# Patient Record
Sex: MALE | Race: BLACK OR AFRICAN AMERICAN | NOT HISPANIC OR LATINO | URBAN - METROPOLITAN AREA
[De-identification: names, ages, dates, MRNs, and addresses within clinical notes are randomized per-mention and may not be internally consistent; named-entity substitution may affect disease eponyms.]

---

## 2021-02-19 ENCOUNTER — EMERGENCY (EMERGENCY)
Facility: HOSPITAL | Age: 31
LOS: 1 days | Discharge: HOME | End: 2021-02-19
Attending: EMERGENCY MEDICINE | Admitting: EMERGENCY MEDICINE
Payer: MEDICAID

## 2021-02-19 VITALS
RESPIRATION RATE: 18 BRPM | OXYGEN SATURATION: 100 % | TEMPERATURE: 98 F | HEART RATE: 92 BPM | SYSTOLIC BLOOD PRESSURE: 161 MMHG | DIASTOLIC BLOOD PRESSURE: 76 MMHG

## 2021-02-19 VITALS
RESPIRATION RATE: 16 BRPM | SYSTOLIC BLOOD PRESSURE: 130 MMHG | DIASTOLIC BLOOD PRESSURE: 74 MMHG | HEART RATE: 70 BPM | TEMPERATURE: 98 F | OXYGEN SATURATION: 100 %

## 2021-02-19 LAB
ALBUMIN SERPL ELPH-MCNC: 4.5 G/DL — SIGNIFICANT CHANGE UP (ref 3.3–5)
ALP SERPL-CCNC: 55 U/L — SIGNIFICANT CHANGE UP (ref 40–120)
ALT FLD-CCNC: 13 U/L — SIGNIFICANT CHANGE UP (ref 4–41)
ANION GAP SERPL CALC-SCNC: 13 MMOL/L — SIGNIFICANT CHANGE UP (ref 7–14)
AST SERPL-CCNC: 15 U/L — SIGNIFICANT CHANGE UP (ref 4–40)
BASOPHILS # BLD AUTO: 0.01 K/UL — SIGNIFICANT CHANGE UP (ref 0–0.2)
BASOPHILS NFR BLD AUTO: 0.2 % — SIGNIFICANT CHANGE UP (ref 0–2)
BILIRUB SERPL-MCNC: 0.3 MG/DL — SIGNIFICANT CHANGE UP (ref 0.2–1.2)
BUN SERPL-MCNC: 19 MG/DL — SIGNIFICANT CHANGE UP (ref 7–23)
CALCIUM SERPL-MCNC: 9 MG/DL — SIGNIFICANT CHANGE UP (ref 8.4–10.5)
CHLORIDE SERPL-SCNC: 101 MMOL/L — SIGNIFICANT CHANGE UP (ref 98–107)
CO2 SERPL-SCNC: 23 MMOL/L — SIGNIFICANT CHANGE UP (ref 22–31)
CREAT SERPL-MCNC: 1.28 MG/DL — SIGNIFICANT CHANGE UP (ref 0.5–1.3)
EOSINOPHIL # BLD AUTO: 0.05 K/UL — SIGNIFICANT CHANGE UP (ref 0–0.5)
EOSINOPHIL NFR BLD AUTO: 1 % — SIGNIFICANT CHANGE UP (ref 0–6)
GLUCOSE SERPL-MCNC: 91 MG/DL — SIGNIFICANT CHANGE UP (ref 70–99)
HCT VFR BLD CALC: 37.3 % — LOW (ref 39–50)
HGB BLD-MCNC: 12.5 G/DL — LOW (ref 13–17)
IANC: 3.5 K/UL — SIGNIFICANT CHANGE UP (ref 1.5–8.5)
IMM GRANULOCYTES NFR BLD AUTO: 0.2 % — SIGNIFICANT CHANGE UP (ref 0–1.5)
LYMPHOCYTES # BLD AUTO: 0.97 K/UL — LOW (ref 1–3.3)
LYMPHOCYTES # BLD AUTO: 20 % — SIGNIFICANT CHANGE UP (ref 13–44)
MAGNESIUM SERPL-MCNC: 1.8 MG/DL — SIGNIFICANT CHANGE UP (ref 1.6–2.6)
MCHC RBC-ENTMCNC: 28 PG — SIGNIFICANT CHANGE UP (ref 27–34)
MCHC RBC-ENTMCNC: 33.5 GM/DL — SIGNIFICANT CHANGE UP (ref 32–36)
MCV RBC AUTO: 83.4 FL — SIGNIFICANT CHANGE UP (ref 80–100)
MONOCYTES # BLD AUTO: 0.31 K/UL — SIGNIFICANT CHANGE UP (ref 0–0.9)
MONOCYTES NFR BLD AUTO: 6.4 % — SIGNIFICANT CHANGE UP (ref 2–14)
NEUTROPHILS # BLD AUTO: 3.5 K/UL — SIGNIFICANT CHANGE UP (ref 1.8–7.4)
NEUTROPHILS NFR BLD AUTO: 72.2 % — SIGNIFICANT CHANGE UP (ref 43–77)
NRBC # BLD: 0 /100 WBCS — SIGNIFICANT CHANGE UP
NRBC # FLD: 0 K/UL — SIGNIFICANT CHANGE UP
PHOSPHATE SERPL-MCNC: 2.2 MG/DL — LOW (ref 2.5–4.5)
PLATELET # BLD AUTO: 165 K/UL — SIGNIFICANT CHANGE UP (ref 150–400)
POTASSIUM SERPL-MCNC: 3.6 MMOL/L — SIGNIFICANT CHANGE UP (ref 3.5–5.3)
POTASSIUM SERPL-SCNC: 3.6 MMOL/L — SIGNIFICANT CHANGE UP (ref 3.5–5.3)
PROT SERPL-MCNC: 7.3 G/DL — SIGNIFICANT CHANGE UP (ref 6–8.3)
RBC # BLD: 4.47 M/UL — SIGNIFICANT CHANGE UP (ref 4.2–5.8)
RBC # FLD: 12.2 % — SIGNIFICANT CHANGE UP (ref 10.3–14.5)
SARS-COV-2 RNA SPEC QL NAA+PROBE: SIGNIFICANT CHANGE UP
SODIUM SERPL-SCNC: 137 MMOL/L — SIGNIFICANT CHANGE UP (ref 135–145)
TROPONIN T, HIGH SENSITIVITY RESULT: <6 NG/L — SIGNIFICANT CHANGE UP
TROPONIN T, HIGH SENSITIVITY RESULT: <6 NG/L — SIGNIFICANT CHANGE UP
WBC # BLD: 4.85 K/UL — SIGNIFICANT CHANGE UP (ref 3.8–10.5)
WBC # FLD AUTO: 4.85 K/UL — SIGNIFICANT CHANGE UP (ref 3.8–10.5)

## 2021-02-19 PROCEDURE — 71046 X-RAY EXAM CHEST 2 VIEWS: CPT | Mod: 26

## 2021-02-19 PROCEDURE — 99285 EMERGENCY DEPT VISIT HI MDM: CPT | Mod: 25

## 2021-02-19 PROCEDURE — 71275 CT ANGIOGRAPHY CHEST: CPT | Mod: 26

## 2021-02-19 PROCEDURE — 70450 CT HEAD/BRAIN W/O DYE: CPT | Mod: 26

## 2021-02-19 PROCEDURE — 74174 CTA ABD&PLVS W/CONTRAST: CPT | Mod: 26

## 2021-02-19 PROCEDURE — 93010 ELECTROCARDIOGRAM REPORT: CPT

## 2021-02-19 NOTE — ED PROVIDER NOTE - OBJECTIVE STATEMENT
31 y/o male with no pmhx presents to ED c/o 1 week of b/l ue and le weakness, numbness, chest pain and difficulty swallowing. Pt states 1 week ago woke up from his sleep with left sided "pulling" chest pain that radiated down his left arm, associated with b/l arm numbness and tingling in his fingertips and b/l leg numbness. Has been intermittent since, occurs about twice daily, and is exertional. Pt exercises at baseline and lifts weights. No back pain. Pt states he also feels intermittent throat tightness and difficulty swallowing. Father history of CVA in 60s. No family hx of MI, no hx of sudden cardiac death. No history of COVID, no recent illness, weakness/numbness not ascending. No vaccine history. No slurred speech, facial droop, headache, vomiting, vision changes, neck pain, abd pain. Denies IV drug use, takes marijuana edibles.

## 2021-02-19 NOTE — ED PROVIDER NOTE - CARE PROVIDER_API CALL
Mark Napoles  NEUROLOGY  [address] [facility], [place] [address]  Phone: [phone]  Fax: [phone]  Follow Up Time:

## 2021-02-19 NOTE — ED ADULT NURSE NOTE - NSIMPLEMENTINTERV_GEN_ALL_ED
Implemented All Universal Safety Interventions:  [place] to call system. Call bell, personal items and telephone within reach. Instruct patient to call for assistance. Room bathroom lighting operational. Non-slip footwear when patient is off stretcher. Physically safe environment: no spills, clutter or unnecessary equipment. Stretcher in lowest position, wheels locked, appropriate side rails in place.

## 2021-02-19 NOTE — ED PROVIDER NOTE - PROGRESS NOTE DETAILS
Spoke with neuro, will see pt in ED REBEKAH Han: Received signout on pt, neuro states they were tied up with a code stroke, states they will see pt in 20 min. REBEKAH Han: Pt was seen and cleared by Neurology resident for outpatient f/u. Pt has been eating, resting comfortably while in the ED.

## 2021-02-19 NOTE — ED ADULT TRIAGE NOTE - CHIEF COMPLAINT QUOTE
chest pain with nausea and generalized numbness since Friday. No PMH. PT denies fever, vomiting and diarrhea.

## 2021-02-19 NOTE — ED PROVIDER NOTE - PATIENT PORTAL LINK FT
You can access the FollowMyHealth Patient Portal offered by [facility] by registering at the following website: [email or web address]. By joining [employer]’s FollowMyHealth portal, you will also be able to view your health information using other applications (apps) compatible with our system.

## 2021-02-19 NOTE — ED PROVIDER NOTE - CLINICAL SUMMARY MEDICAL DECISION MAKING FREE TEXT BOX
Sophy: 1 wk exertional L CP and (B) UE and LE numbness (started at once, not progressing up). Describes difficulty swallowing. No SOB. No recent vaccines (ie, not likely GBS). No HA/vision change. On/off 1 wk. No recent illnesses. PE: difficulty elevating arms and legs. 5/5 distal arm (B) strength. Normal EKG. Check EKG, trop, CT brain, labs. Neuro consult. Sophy: 1 month ago, did heavy weightlifting. Got scleral injection after that. Hasn't weight-lifted since b/c had hair transplant in late Jan. Then, 1 wk ago, awoke w/ SSCP that he also feels in the back, worse w/ exertion and a/w (B) UE and LE numbness (which started at once, not progressing up). Describes difficulty swallowing. No SOB. No recent vaccines (ie, not likely GBS). No HA/vision change. On/off 1 wk. No recent illnesses. PE: shakes arms and legs when elevating them, but no overt weakness. 5/5 distal arm (B) strength. Normal EKG. Check EKG, trop, CT angio for aortic dissection, labs. Neuro consult.

## 2021-02-19 NOTE — ED ADULT NURSE NOTE - OBJECTIVE STATEMENT
Pt presents to intake, A&Ox4, ambulatory w/o assistance, here for evaluation of chest pain x1wk, pt also endorsing to SOB when asleep that sometimes wakes him up. Pt also states he "sometimes get dizzy when exercising." Denies shortness of breath, palpitations, diaphoresis, headaches, fevers, dizziness, nausea, vomiting, diarrhea, or urinary symptoms at this time. IV established in left arm with a 20G, labs drawn and sent, call bell in reach, warm blanket provided, bed in lowest position, side rails up x2, MD evaluation in progress. Report given to primary RN.

## 2021-02-19 NOTE — ED PROVIDER NOTE - NSFOLLOWUPINSTRUCTIONS_ED_ALL_ED_FT
Follow up with a Neurologist, your primary doctor and a Cardiologist. You can see Dr. Napoles above and you can call  to schedule an appointment with a Cardiologist. Advance activity as tolerated.  Continue all previously prescribed medications as directed.  Follow up with your primary care physician in 48-72 hours- bring copies of your results.  Return to the ER for worsening or persistent symptoms, and/or ANY NEW OR CONCERNING SYMPTOMS. If you have issues obtaining follow up, please call: [phone] to obtain a doctor or specialist who takes your insurance in your area.  You may call [phone] to make an appointment with the internal medicine clinic.

## 2021-02-19 NOTE — ED PROVIDER NOTE - ATTENDING CONTRIBUTION TO CARE
I performed a face-to-face evaluation of the patient and performed a history and physical examination. I agree with the history and physical examination.    1 wk exertional L CP and (B) UE and LE numbness (started at once, not progressing up). Describes difficulty swallowing. No SOB. No recent vaccines (ie, not likely GBS). No HA/vision change. On/off 1 wk. No recent illnesses. PE: difficulty elevating arms and legs. 5/5 distal arm (B) strength. Normal EKG. Check EKG, trop, CT brain, labs. Neuro consult. I performed a face-to-face evaluation of the patient and performed a history and physical examination. I agree with the history and physical examination.    1 month ago, did heavy weightlifting. Got scleral injection after that. Hasn't weight-lifted since b/c had hair transplant in late Jan. Then, 1 wk ago, awoke w/ SSCP that he also feels in the back, worse w/ exertion and a/w (B) UE and LE numbness (which started at once, not progressing up). Describes difficulty swallowing. No SOB. No recent vaccines (ie, not likely GBS). No HA/vision change. On/off 1 wk. No recent illnesses. PE: shakes arms and legs when elevating them, but no overt weakness. 5/5 distal arm (B) strength. Normal EKG. Check EKG, trop, CT angio for aortic dissection, labs. Neuro consult.

## 2021-02-20 NOTE — CONSULT NOTE ADULT - ASSESSMENT
30M w/ no PMH presents w/ L. sided chest pain assocaited w/ numbness and tingling in his fingertips and legs.     Impression: Chest pain assocaited w/ numbness/tingling of unclear etiology. Low suspicion for neurological etiology. Possibly related to anxiety.     Plan:   [] No further inpatient workup   [] F/u with Dr. Mark Napoles within 1 week

## 2021-02-20 NOTE — CONSULT NOTE ADULT - SUBJECTIVE AND OBJECTIVE BOX
SAMAN LEPE  Male  MRN-[id number]    HPI:    30M w/ no PMH presents w/ L. sided chest pain assocaited w/ numbness and tingling in his fingertips and legs.     Pt endorses interimttent chest pain associated w/ numbness/tingling in both hands and feet. No waekness. States he thinks it was GERD so took omeprazole. No CVA. Denies changes in speech, vision, hearing, swallowing, voice quality, numbness/tingling, weakness, balance/room-spinning sensations. No previous history of CVA/TIA, seizures, migraines. No history of arrythmia. No AC. No history of MS or AI disorder in family.     NIHSS:  MRS:     ROS: All negative except as mentioned in HPI    PAST MEDICAL & SURGICAL HISTORY:  No pertinent past medical history    No significant past surgical history      MEDICATIONS  (STANDING):    MEDICATIONS  (PRN):    Allergies    No Known Allergies    Intolerances        VITAL SIGNS:  T(F): 98.3  HR: 70  BP: 130/74  RR: 16  SpO2: 100%    Exam:   MS: Oriented x3. Fluent. Follows crossed commands.   CN: VFF. EOMI. V1-V3 intact. Face symmetric. T/u midline.   Motor: Full strength throughout.   Sensory: Intact to LT throughout   Reflexes: 2+ throughout. Babinski absent bilaterally.   Coordination: No dysmetria on FNF or ataxia on HTS bilaterally   Gait: Deferred    LABS:                          12.5   4.85  )-----------( 165      ( 19 Feb 2021 17:36 )             37.3     02-19    137  |  101  |  19  ----------------------------<  91  3.6   |  23  |  1.28    Ca    9.0      19 Feb 2021 17:36  Phos  2.2     02-19  Mg     1.8     02-19    TPro  7.3  /  Alb  4.5  /  TBili  0.3  /  DBili  x   /  AST  15  /  ALT  13  /  AlkPhos  55  02-19        RADIOLOGY & ADDITIONAL STUDIES:

## 2021-03-05 ENCOUNTER — EMERGENCY (EMERGENCY)
Facility: HOSPITAL | Age: 31
LOS: 1 days | Discharge: HOME | End: 2021-03-05
Attending: EMERGENCY MEDICINE | Admitting: EMERGENCY MEDICINE
Payer: MEDICAID

## 2021-03-05 VITALS
HEART RATE: 58 BPM | TEMPERATURE: 98 F | SYSTOLIC BLOOD PRESSURE: 135 MMHG | OXYGEN SATURATION: 100 % | RESPIRATION RATE: 18 BRPM | DIASTOLIC BLOOD PRESSURE: 84 MMHG

## 2021-03-05 PROCEDURE — 99285 EMERGENCY DEPT VISIT HI MDM: CPT

## 2021-03-05 NOTE — ED ADULT TRIAGE NOTE - CHIEF COMPLAINT QUOTE
Pt c/o abdominal pain mid-upper stomach.  Reports has been having intermittent episodes pain & nausea for the past few weeks, has had work up done with cardiologist & chiropractor b/c initially pain was more chest & back.  Pt denies vomiting, prior stomach surgeries. No acute distress noted.

## 2021-03-06 VITALS
DIASTOLIC BLOOD PRESSURE: 71 MMHG | RESPIRATION RATE: 16 BRPM | HEART RATE: 50 BPM | OXYGEN SATURATION: 100 % | SYSTOLIC BLOOD PRESSURE: 129 MMHG | TEMPERATURE: 98 F

## 2021-03-06 PROBLEM — Z78.9 OTHER SPECIFIED HEALTH STATUS: Chronic | Status: ACTIVE | Noted: 2021-02-19

## 2021-03-06 LAB
ALBUMIN SERPL ELPH-MCNC: 4.2 G/DL — SIGNIFICANT CHANGE UP (ref 3.3–5)
ALP SERPL-CCNC: 59 U/L — SIGNIFICANT CHANGE UP (ref 40–120)
ALT FLD-CCNC: 10 U/L — SIGNIFICANT CHANGE UP (ref 4–41)
ANION GAP SERPL CALC-SCNC: 9 MMOL/L — SIGNIFICANT CHANGE UP (ref 7–14)
AST SERPL-CCNC: 18 U/L — SIGNIFICANT CHANGE UP (ref 4–40)
BASOPHILS # BLD AUTO: 0.02 K/UL — SIGNIFICANT CHANGE UP (ref 0–0.2)
BASOPHILS NFR BLD AUTO: 0.4 % — SIGNIFICANT CHANGE UP (ref 0–2)
BILIRUB SERPL-MCNC: 0.3 MG/DL — SIGNIFICANT CHANGE UP (ref 0.2–1.2)
BUN SERPL-MCNC: 15 MG/DL — SIGNIFICANT CHANGE UP (ref 7–23)
CALCIUM SERPL-MCNC: 9.3 MG/DL — SIGNIFICANT CHANGE UP (ref 8.4–10.5)
CHLORIDE SERPL-SCNC: 103 MMOL/L — SIGNIFICANT CHANGE UP (ref 98–107)
CO2 SERPL-SCNC: 26 MMOL/L — SIGNIFICANT CHANGE UP (ref 22–31)
CREAT SERPL-MCNC: 1.42 MG/DL — HIGH (ref 0.5–1.3)
CRP SERPL-MCNC: <4 MG/L — SIGNIFICANT CHANGE UP
EOSINOPHIL # BLD AUTO: 0.11 K/UL — SIGNIFICANT CHANGE UP (ref 0–0.5)
EOSINOPHIL NFR BLD AUTO: 2.3 % — SIGNIFICANT CHANGE UP (ref 0–6)
ERYTHROCYTE [SEDIMENTATION RATE] IN BLOOD: 4 MM/HR — SIGNIFICANT CHANGE UP (ref 1–15)
GLUCOSE SERPL-MCNC: 88 MG/DL — SIGNIFICANT CHANGE UP (ref 70–99)
HCT VFR BLD CALC: 39.6 % — SIGNIFICANT CHANGE UP (ref 39–50)
HGB BLD-MCNC: 13 G/DL — SIGNIFICANT CHANGE UP (ref 13–17)
IANC: 2.37 K/UL — SIGNIFICANT CHANGE UP (ref 1.5–8.5)
IMM GRANULOCYTES NFR BLD AUTO: 0.2 % — SIGNIFICANT CHANGE UP (ref 0–1.5)
LIDOCAIN IGE QN: 46 U/L — SIGNIFICANT CHANGE UP (ref 7–60)
LYMPHOCYTES # BLD AUTO: 1.91 K/UL — SIGNIFICANT CHANGE UP (ref 1–3.3)
LYMPHOCYTES # BLD AUTO: 40 % — SIGNIFICANT CHANGE UP (ref 13–44)
MCHC RBC-ENTMCNC: 27.7 PG — SIGNIFICANT CHANGE UP (ref 27–34)
MCHC RBC-ENTMCNC: 32.8 GM/DL — SIGNIFICANT CHANGE UP (ref 32–36)
MCV RBC AUTO: 84.3 FL — SIGNIFICANT CHANGE UP (ref 80–100)
MONOCYTES # BLD AUTO: 0.35 K/UL — SIGNIFICANT CHANGE UP (ref 0–0.9)
MONOCYTES NFR BLD AUTO: 7.3 % — SIGNIFICANT CHANGE UP (ref 2–14)
NEUTROPHILS # BLD AUTO: 2.37 K/UL — SIGNIFICANT CHANGE UP (ref 1.8–7.4)
NEUTROPHILS NFR BLD AUTO: 49.8 % — SIGNIFICANT CHANGE UP (ref 43–77)
NRBC # BLD: 0 /100 WBCS — SIGNIFICANT CHANGE UP
NRBC # FLD: 0 K/UL — SIGNIFICANT CHANGE UP
PLATELET # BLD AUTO: 171 K/UL — SIGNIFICANT CHANGE UP (ref 150–400)
POTASSIUM SERPL-MCNC: 4.1 MMOL/L — SIGNIFICANT CHANGE UP (ref 3.5–5.3)
POTASSIUM SERPL-SCNC: 4.1 MMOL/L — SIGNIFICANT CHANGE UP (ref 3.5–5.3)
PROT SERPL-MCNC: 6.9 G/DL — SIGNIFICANT CHANGE UP (ref 6–8.3)
RBC # BLD: 4.7 M/UL — SIGNIFICANT CHANGE UP (ref 4.2–5.8)
RBC # FLD: 12.1 % — SIGNIFICANT CHANGE UP (ref 10.3–14.5)
SODIUM SERPL-SCNC: 138 MMOL/L — SIGNIFICANT CHANGE UP (ref 135–145)
TROPONIN T, HIGH SENSITIVITY RESULT: 6 NG/L — SIGNIFICANT CHANGE UP
TSH SERPL-MCNC: 3.52 UIU/ML — SIGNIFICANT CHANGE UP (ref 0.27–4.2)
WBC # BLD: 4.77 K/UL — SIGNIFICANT CHANGE UP (ref 3.8–10.5)
WBC # FLD AUTO: 4.77 K/UL — SIGNIFICANT CHANGE UP (ref 3.8–10.5)

## 2021-03-06 RX ORDER — DIPHENHYDRAMINE HCL 50 MG
50 CAPSULE ORAL ONCE
Refills: 0 | Status: COMPLETED | OUTPATIENT
Start: 2021-03-06 | End: 2021-03-06

## 2021-03-06 RX ORDER — FAMOTIDINE 10 MG/ML
20 INJECTION INTRAVENOUS ONCE
Refills: 0 | Status: COMPLETED | OUTPATIENT
Start: 2021-03-06 | End: 2021-03-06

## 2021-03-06 RX ADMIN — Medication 50 MILLIGRAM(S): at 04:54

## 2021-03-06 RX ADMIN — FAMOTIDINE 20 MILLIGRAM(S): 10 INJECTION INTRAVENOUS at 02:04

## 2021-03-06 NOTE — ED PROVIDER NOTE - ATTENDING CONTRIBUTION TO CARE
I performed a face-to-face evaluation of the patient and performed a history and physical examination. I agree with the history and physical examination.    ~6 wks abd pain, mild wt. loss 2/2 poor appetite, migratory joint aches (no swelling), no rash, constipation (small, hard stools), burning pain in throat. Seen here a few weeks ago: Lab results unremarkable. Radiology results unremarkable. DDx: IBS, celiac (GI complaints and arthralgias). Check celiac labs. Refer to GI.

## 2021-03-06 NOTE — ED PROVIDER NOTE - PATIENT PORTAL LINK FT
You can access the FollowMyHealth Patient Portal offered by [facility] by registering at the following website: [email or web address]. By joining [employer]’s FollowMyHealth portal, you will also be able to view your health information using other applications (apps) compatible with our system.

## 2021-03-06 NOTE — ED ADULT NURSE NOTE - NSFALLRSKUNASSIST_ED_ALL_ED
Pt complaint of itchy and redness to IV site and up arm unsure why, AA  Budour exam Stopped Cipro with 120 mls infused 240 mg given.  Benadryl 25 mg IV given per AA. Dr. Kole Mackay informed no further antibiotic orders given.   no

## 2021-03-06 NOTE — ED PROVIDER NOTE - OBJECTIVE STATEMENT
~6 wks abd pain (at first perimubilical, now radiating to pelvis), mild wt. loss 2/2 poor appetite, migratory joint aches (no swelling), no rash, constipation (small, hard stools), burning pain in throat. Seen here a few weeks ago: Lab results unremarkable. Radiology results unremarkable.

## 2021-03-06 NOTE — ED PROVIDER NOTE - NSPTACCESSSVCSAPPTDETAILS_ED_ALL_ED_FT
1 week please for abdominal pain.     PCP for immediate care as well for follow up creatinine. Thanks!

## 2021-03-06 NOTE — ED PROVIDER NOTE - NSFOLLOWUPCLINICS_GEN_ALL_ED_FT
[facility]  Gastroenterology  [address] [place] [address] [address]  [place] [address]  Phone: [phone]  Fax:   Follow Up Time:     [facility] Internal [facility]  General Internal Medicine  [address] [place] [id number]  Phone: [phone]  Fax:   Follow Up Time:

## 2021-03-06 NOTE — ED ADULT NURSE NOTE - OBJECTIVE STATEMENT
Break coverage- Pt received into spot #27. AAOx4, c/o abdominal pain mid-upper stomach. Abdomen non distended, non tender upon palpation. Pt states he has been having intermittent episodes pain & nausea for the past few weeks. Denies diarrhea. Denies SOB/CP. MD sanchez performed at bedside. no acute distress. Awaiting further plan of care.

## 2021-03-06 NOTE — ED PROVIDER NOTE - NSFOLLOWUPINSTRUCTIONS_ED_ALL_ED_FT
The source of your pain was not found on this visit.     Your creatinine was mildly elevated. Please follow up with a primary care physician. You should get a call from someone from our team to set up an appointment. If you do not please see info above and call them next week for appointment.     Also, as discussed please follow up with GI. You should get a call from someone from our team to set up an appointment. If you do not please see info above and call them next week for appointment.     Return to the Emergency Department for worsening or persistent symptoms, and/or ANY NEW OR CONCERNING SYMPTOMS. If you have issues obtaining follow up, please call: [phone] to obtain a doctor or specialist who takes your insurance in your area.

## 2021-03-06 NOTE — ED PROVIDER NOTE - PROGRESS NOTE DETAILS
Joseph Frankel PGY2: Patient had Joseph Frankel PGY2: Patient had some throat discomfort after pepcid. No sob, n/v/d, skin involvement. Low suspicion for allergic reaction but gave benadryl. Patient now feels better. Creatinine mildly elevated and encouraged follow up with PCP. will also give follow up with GI. Discussed plan and return precautions with patient who understands and agrees. All questions answered.

## 2021-03-06 NOTE — ED PROVIDER NOTE - CLINICAL SUMMARY MEDICAL DECISION MAKING FREE TEXT BOX
Sophy: ~6 wks abd pain, mild wt. loss 2/2 poor appetite, migratory joint aches (no swelling), no rash, constipation (small, hard stools), burning pain in throat. Seen here a few weeks ago: Lab results unremarkable. Radiology results unremarkable. DDx: IBS, celiac (GI complaints and arthralgias). Check celiac labs. Refer to GI.

## 2021-03-08 LAB
TTG IGA SER-ACNC: <1.2 U/ML — SIGNIFICANT CHANGE UP
TTG IGA SER-ACNC: NEGATIVE — SIGNIFICANT CHANGE UP

## 2021-03-09 LAB
TTG IGG SER IA-ACNC: NEGATIVE — SIGNIFICANT CHANGE UP
TTG IGG SER-ACNC: 1.8 U/ML — SIGNIFICANT CHANGE UP

## 2021-03-25 ENCOUNTER — EMERGENCY (EMERGENCY)
Facility: HOSPITAL | Age: 31
LOS: 1 days | Discharge: HOME | End: 2021-03-25
Attending: EMERGENCY MEDICINE | Admitting: EMERGENCY MEDICINE
Payer: MEDICAID

## 2021-03-25 VITALS
TEMPERATURE: 99 F | OXYGEN SATURATION: 100 % | RESPIRATION RATE: 18 BRPM | DIASTOLIC BLOOD PRESSURE: 66 MMHG | HEART RATE: 68 BPM | SYSTOLIC BLOOD PRESSURE: 139 MMHG

## 2021-03-25 VITALS
SYSTOLIC BLOOD PRESSURE: 130 MMHG | TEMPERATURE: 98 F | DIASTOLIC BLOOD PRESSURE: 67 MMHG | OXYGEN SATURATION: 100 % | RESPIRATION RATE: 16 BRPM | HEART RATE: 60 BPM

## 2021-03-25 PROCEDURE — 93010 ELECTROCARDIOGRAM REPORT: CPT

## 2021-03-25 PROCEDURE — 99284 EMERGENCY DEPT VISIT MOD MDM: CPT | Mod: 25

## 2021-03-25 NOTE — ED ADULT TRIAGE NOTE - CHIEF COMPLAINT QUOTE
pt sent in by MD for abnormal labs and abnormal EKG, pt reports intermittent chest pain and protein in urine.

## 2021-03-25 NOTE — ED PROVIDER NOTE - CLINICAL SUMMARY MEDICAL DECISION MAKING FREE TEXT BOX
29 y/o male with no significant PMH presenting to the ED for concern of abnormal EKG and labs. Vitals stable upon arrival. Patient had extensive work up in the ED two weeks ago with negative CTA chest and abdomen and mildly elevated creatinine. EKG appears normal with no ischemic changes. No need for additional w/u at this time, patient to follow up outpatient with cardiology. Marcial Bose,  PGY2 29 y/o male with no significant PMH presenting to the ED for concern of abnormal EKG and labs. His PCP referred him for ourpt cards f/u but patient became worried and came to ED. No new acute symptoms. Vitals stable upon arrival. Patient had extensive work up in the ED two weeks ago with negative CTA chest and abdomen and mildly elevated creatinine. EKG appears normal with no ischemic changes. No need for additional w/u at this time, provided reassurance to patient to follow up outpatient with cardiology and nephrology and return precautions to ED discussed.

## 2021-03-25 NOTE — ED PROVIDER NOTE - PATIENT PORTAL LINK FT
You can access the FollowMyHealth Patient Portal offered by [facility] by registering at the following website: [email or web address]. By joining [employer]’s FollowMyHealth portal, you will also be able to view your health information using other applications (apps) compatible with our system.

## 2021-03-25 NOTE — ED PROVIDER NOTE - PHYSICAL EXAMINATION
GENERAL: Awake, alert, NAD  HEENT: NC/AT, moist mucous membranes, PERRL, EOMI  LUNGS: CTAB, no wheezes or crackles   CARDIAC: RRR, +2/6 systolic ejection murmur  ABDOMEN: Soft, normal BS, non tender, non distended, no rebound, no guarding  BACK: No midline spinal tenderness, no CVA tenderness  EXT: No edema, no calf tenderness, 2+ DP pulses bilaterally, no deformities.  NEURO: A&Ox3. Moving all extremities.  SKIN: Warm and dry. No rash.  PSYCH: Normal affect.

## 2021-03-25 NOTE — ED PROVIDER NOTE - OBJECTIVE STATEMENT
29 y/o male with no significant PMH presenting to the ED with concern for abnormal lab and EKG. Saw his PMD today to follow up on elevated creatinine in the ED and had labs performed that showed persistently elevated cr. Also with heart murmur on exam and "abnormal EKG". Was told to follow up with cardiology but decided to come to the ED for further evaluation. Reports 2 months of intermittent chest discomfort, no shortness of breath, no nausea or vomiting, no fever or chills. No urinary difficulty. No hematuria. 31 y/o male with no significant PMH presenting to the ED with concern for abnormal lab and EKG. Saw his PMD today to follow up on elevated creatinine in the ED and had labs performed that showed persistently elevated cr. Also with heart murmur on exam and "abnormal EKG". Was told to follow up with cardiology as outpatient but decided to come to the ED for further evaluation as patient became concerned. Reports 2 months of intermittent chest discomfort, no shortness of breath, no nausea or vomiting, no fever or chills, no LE edema. No urinary difficulty. No hematuria.